# Patient Record
Sex: MALE | Race: WHITE | NOT HISPANIC OR LATINO | URBAN - NONMETROPOLITAN AREA
[De-identification: names, ages, dates, MRNs, and addresses within clinical notes are randomized per-mention and may not be internally consistent; named-entity substitution may affect disease eponyms.]

---

## 2018-08-16 PROBLEM — H52.03: Noted: 2019-04-19

## 2018-08-16 PROBLEM — E11.3293: Noted: 2018-08-16

## 2018-08-16 PROBLEM — H52.4: Noted: 2019-04-19

## 2018-08-16 PROBLEM — H52.223: Noted: 2019-04-19

## 2018-08-16 PROBLEM — H53.001: Noted: 2018-08-16

## 2019-04-19 ENCOUNTER — IMPORTED ENCOUNTER (OUTPATIENT)
Dept: URBAN - NONMETROPOLITAN AREA CLINIC 1 | Facility: CLINIC | Age: 44
End: 2019-04-19

## 2019-04-19 PROCEDURE — 92015 DETERMINE REFRACTIVE STATE: CPT

## 2019-04-19 PROCEDURE — 99214 OFFICE O/P EST MOD 30 MIN: CPT

## 2019-04-19 NOTE — PATIENT DISCUSSION
IDDM with stable NPDR OU s/p Laser Tx OUDiscussed diagnosis with patient. Discussed the risk of diabetic damage of the retina with potential vision loss and the importance of routine follow-up. Emphasized strict blood sugar control. Continue to monitor. Amblyopia ODDiscussed diagnosis in detail with patient. Continue to monitor. Hyperopia/Astigmatism/Presbyopia OUDiscussed refractive status in detail with patient. New glasses Rx given today. Continue to monitor.

## 2019-05-16 ENCOUNTER — IMPORTED ENCOUNTER (OUTPATIENT)
Dept: URBAN - NONMETROPOLITAN AREA CLINIC 1 | Facility: CLINIC | Age: 44
End: 2019-05-16

## 2019-05-16 ENCOUNTER — PREPPED CHART (OUTPATIENT)
Dept: URBAN - NONMETROPOLITAN AREA CLINIC 1 | Facility: CLINIC | Age: 44
End: 2019-05-16

## 2019-05-16 PROBLEM — H52.4: Noted: 2019-05-16

## 2019-05-16 PROBLEM — E10.3292: Noted: 2019-05-16

## 2019-05-16 PROBLEM — H53.001: Noted: 2019-05-16

## 2019-05-16 PROBLEM — E10.3591: Noted: 2019-05-16

## 2019-05-16 PROBLEM — H25.13: Noted: 2019-05-16

## 2019-05-16 PROCEDURE — 92250 FUNDUS PHOTOGRAPHY W/I&R: CPT

## 2019-05-16 PROCEDURE — 99213 OFFICE O/P EST LOW 20 MIN: CPT

## 2019-05-16 NOTE — PATIENT DISCUSSION
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 - Discussed the risk of diabetic damage of the retina with potential vision loss and the importance of routine follow-up. - Emphasized strict blood sugar control.- Recommend no sodas - Patient noticed yesterday that vision became ""cloudy and it seems to move"" all of a sudden- Patient states that last A1C was 7.2 and last blood sugar was 134 - Optos done today shows Ma's exudates vitreous hemes/scaffolding  and possible RD in OD. OS shows Ma's and exudates (difficult view today)- Recommend patient seeing [facility]. Patient agrees with plan  - Continue to monitor. Amblyopia OD- Discussed diagnosis in detail with patient. - Patient is stable at this time - Continue to monitor. [facility] OU- Discussed diagnosis in detail with patient- Discussed signs and symptoms of progression- Discussed UV protection- No treatment needed at this time - Continue to monitorHyperopia/Astigmatism/Presbyopia OU- Discussed refractive status in detail with patient. - Continue to monitor. "

## 2022-03-18 ASSESSMENT — VISUAL ACUITY
OD_CC: 20/200
OS_CC: 20/20-

## 2022-03-18 ASSESSMENT — TONOMETRY
OS_IOP_MMHG: 13
OD_IOP_MMHG: 12

## 2022-03-23 ENCOUNTER — NEW PATIENT (OUTPATIENT)
Dept: URBAN - NONMETROPOLITAN AREA CLINIC 1 | Facility: CLINIC | Age: 47
End: 2022-03-23

## 2022-03-23 DIAGNOSIS — H52.4: ICD-10-CM

## 2022-03-23 PROCEDURE — 92015 DETERMINE REFRACTIVE STATE: CPT

## 2022-03-23 PROCEDURE — 92004 COMPRE OPH EXAM NEW PT 1/>: CPT

## 2022-03-23 ASSESSMENT — TONOMETRY
OD_IOP_MMHG: 14
OS_IOP_MMHG: 14

## 2022-03-23 ASSESSMENT — VISUAL ACUITY
OD_CC: 20/100+
OS_CC: 20/20

## 2022-03-23 NOTE — PATIENT DISCUSSION
Patient is currently getting injections from [facility]. Looks to be improved today OU. Continue to monitor.

## 2022-04-10 ASSESSMENT — VISUAL ACUITY
OD_SC: 20/200
OS_SC: 20/20-
OD_SC: 20/60+1
OS_SC: 20/20
OU_CC: 20/25

## 2022-04-10 ASSESSMENT — TONOMETRY
OD_IOP_MMHG: 13
OD_IOP_MMHG: 17
OS_IOP_MMHG: 13
OS_IOP_MMHG: 18

## 2023-05-25 ENCOUNTER — ESTABLISHED PATIENT (OUTPATIENT)
Dept: URBAN - NONMETROPOLITAN AREA CLINIC 1 | Facility: CLINIC | Age: 48
End: 2023-05-25

## 2023-05-25 DIAGNOSIS — H53.021: ICD-10-CM

## 2023-05-25 DIAGNOSIS — H52.4: ICD-10-CM

## 2023-05-25 DIAGNOSIS — Z79.4: ICD-10-CM

## 2023-05-25 DIAGNOSIS — E11.3511: ICD-10-CM

## 2023-05-25 PROCEDURE — 92015 DETERMINE REFRACTIVE STATE: CPT

## 2023-05-25 PROCEDURE — 92014 COMPRE OPH EXAM EST PT 1/>: CPT

## 2023-05-25 ASSESSMENT — VISUAL ACUITY
OU_CC: 20/20
OS_CC: 20/20
OD_CC: 20/40-1

## 2023-05-25 ASSESSMENT — TONOMETRY
OD_IOP_MMHG: 15
OS_IOP_MMHG: 15

## 2024-05-28 ENCOUNTER — ESTABLISHED PATIENT (OUTPATIENT)
Dept: URBAN - NONMETROPOLITAN AREA CLINIC 1 | Facility: CLINIC | Age: 49
End: 2024-05-28

## 2024-05-28 DIAGNOSIS — H52.4: ICD-10-CM

## 2024-05-28 PROCEDURE — 92014 COMPRE OPH EXAM EST PT 1/>: CPT

## 2024-05-28 PROCEDURE — 92015 DETERMINE REFRACTIVE STATE: CPT

## 2024-05-28 ASSESSMENT — VISUAL ACUITY
OD_CC: 20/50-2
OS_CC: 20/25-2
OU_CC: 20/20

## 2024-05-28 ASSESSMENT — TONOMETRY
OD_IOP_MMHG: 14
OS_IOP_MMHG: 14

## 2025-05-29 ENCOUNTER — COMPREHENSIVE EXAM (OUTPATIENT)
Age: 50
End: 2025-05-29

## 2025-05-29 DIAGNOSIS — H52.4: ICD-10-CM

## 2025-05-29 PROCEDURE — 92014 COMPRE OPH EXAM EST PT 1/>: CPT

## 2025-05-29 PROCEDURE — 92015 DETERMINE REFRACTIVE STATE: CPT

## 2025-06-17 ENCOUNTER — CONTACT LENSES/GLASSES VISIT (OUTPATIENT)
Age: 50
End: 2025-06-17

## 2025-06-17 DIAGNOSIS — H52.4: ICD-10-CM

## 2025-06-17 PROCEDURE — 92015 DETERMINE REFRACTIVE STATE: CPT
